# Patient Record
Sex: MALE | Race: WHITE
[De-identification: names, ages, dates, MRNs, and addresses within clinical notes are randomized per-mention and may not be internally consistent; named-entity substitution may affect disease eponyms.]

---

## 2020-09-06 ENCOUNTER — HOSPITAL ENCOUNTER (EMERGENCY)
Dept: HOSPITAL 61 - ER | Age: 50
LOS: 1 days | Discharge: HOME | End: 2020-09-07
Payer: SELF-PAY

## 2020-09-06 VITALS — WEIGHT: 270.51 LBS | BODY MASS INDEX: 45.07 KG/M2 | HEIGHT: 65 IN

## 2020-09-06 DIAGNOSIS — S90.31XA: Primary | ICD-10-CM

## 2020-09-06 DIAGNOSIS — Z90.49: ICD-10-CM

## 2020-09-06 DIAGNOSIS — I50.9: ICD-10-CM

## 2020-09-06 DIAGNOSIS — I11.9: ICD-10-CM
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
## 2020-09-06 DIAGNOSIS — Z98.890: ICD-10-CM

## 2020-09-06 DIAGNOSIS — F17.200: ICD-10-CM

## 2020-09-06 DIAGNOSIS — R60.0: ICD-10-CM
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
## 2020-09-06 DIAGNOSIS — K21.9: ICD-10-CM
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
## 2020-09-06 DIAGNOSIS — M79.89: ICD-10-CM

## 2020-09-06 PROCEDURE — 99284 EMERGENCY DEPT VISIT MOD MDM: CPT

## 2020-09-06 PROCEDURE — 36415 COLL VENOUS BLD VENIPUNCTURE: CPT

## 2020-09-06 PROCEDURE — 96372 THER/PROPH/DIAG INJ SC/IM: CPT

## 2020-09-06 PROCEDURE — 85025 COMPLETE CBC W/AUTO DIFF WBC: CPT

## 2020-09-06 PROCEDURE — 83880 ASSAY OF NATRIURETIC PEPTIDE: CPT

## 2020-09-06 PROCEDURE — 80053 COMPREHEN METABOLIC PANEL: CPT

## 2020-09-06 PROCEDURE — 96374 THER/PROPH/DIAG INJ IV PUSH: CPT

## 2020-09-06 PROCEDURE — 73630 X-RAY EXAM OF FOOT: CPT

## 2020-09-07 VITALS — SYSTOLIC BLOOD PRESSURE: 112 MMHG | DIASTOLIC BLOOD PRESSURE: 65 MMHG

## 2020-09-07 LAB
ALBUMIN SERPL-MCNC: 3.6 G/DL (ref 3.4–5)
ALBUMIN/GLOB SERPL: 0.8 {RATIO} (ref 1–1.7)
ALP SERPL-CCNC: 102 U/L (ref 46–116)
ALT SERPL-CCNC: 26 U/L (ref 16–63)
ANION GAP SERPL CALC-SCNC: 11 MMOL/L (ref 6–14)
ANISOCYTOSIS BLD QL SMEAR: (no result)
AST SERPL-CCNC: 22 U/L (ref 15–37)
BASOPHILS # BLD AUTO: 0 X10^3/UL (ref 0–0.2)
BASOPHILS NFR BLD: 1 % (ref 0–3)
BILIRUB SERPL-MCNC: 0.4 MG/DL (ref 0.2–1)
BUN SERPL-MCNC: 17 MG/DL (ref 8–26)
BUN/CREAT SERPL: 15 (ref 6–20)
CALCIUM SERPL-MCNC: 9 MG/DL (ref 8.5–10.1)
CHLORIDE SERPL-SCNC: 100 MMOL/L (ref 98–107)
CO2 SERPL-SCNC: 28 MMOL/L (ref 21–32)
CREAT SERPL-MCNC: 1.1 MG/DL (ref 0.7–1.3)
EOSINOPHIL NFR BLD: 0.2 X10^3/UL (ref 0–0.7)
EOSINOPHIL NFR BLD: 2 % (ref 0–3)
ERYTHROCYTE [DISTWIDTH] IN BLOOD BY AUTOMATED COUNT: 20.6 % (ref 11.5–14.5)
GFR SERPLBLD BASED ON 1.73 SQ M-ARVRAT: 70.6 ML/MIN
GLOBULIN SER-MCNC: 4.8 G/DL (ref 2.2–3.8)
GLUCOSE SERPL-MCNC: 101 MG/DL (ref 70–99)
HCT VFR BLD CALC: 34.7 % (ref 39–53)
HGB BLD-MCNC: 10.7 G/DL (ref 13–17.5)
HYPOCHROMIA BLD QL SMEAR: (no result)
LYMPHOCYTES # BLD: 1.5 X10^3/UL (ref 1–4.8)
LYMPHOCYTES NFR BLD AUTO: 17 % (ref 24–48)
MCH RBC QN AUTO: 22 PG (ref 25–35)
MCHC RBC AUTO-ENTMCNC: 31 G/DL (ref 31–37)
MCV RBC AUTO: 72 FL (ref 79–100)
MICROCYTES BLD QL SMEAR: SLIGHT
MONO #: 0.9 X10^3/UL (ref 0–1.1)
MONOCYTES NFR BLD: 10 % (ref 0–9)
NEUT #: 6 X10^3/UL (ref 1.8–7.7)
NEUTROPHILS NFR BLD AUTO: 70 % (ref 31–73)
PLATELET # BLD AUTO: 432 X10^3/UL (ref 140–400)
PLATELET # BLD EST: (no result) 10*3/UL
POLYCHROMASIA BLD QL SMEAR: SLIGHT
POTASSIUM SERPL-SCNC: 3.4 MMOL/L (ref 3.5–5.1)
PROT SERPL-MCNC: 8.4 G/DL (ref 6.4–8.2)
RBC # BLD AUTO: 4.79 X10^6/UL (ref 4.3–5.7)
SODIUM SERPL-SCNC: 139 MMOL/L (ref 136–145)
WBC # BLD AUTO: 8.6 X10^3/UL (ref 4–11)

## 2020-09-07 NOTE — PHYS DOC
Past Medical History


Past Medical History:  CHF, GERD, Hypertension, Other


Additional Past Medical Histor:  GASTRIC ULCERS, COLON CANCER


Past Surgical History:  Cholecystectomy


Additional Past Surgical Histo:  "R &Y" GASTRIC SLEEVE, MULTIPLE BACK SX, COLON 

SURGERY


Smoking Status:  Current Every Day Smoker


Alcohol Use:  None


Drug Use:  None





General Adult


EDM:


Chief Complaint:  LOWER EXTREMITY SWELLING





HPI:


HPI:





Patient is a 50  year old male presents with the chief complaint of bilateral 

lower extremity edema and right foot pain.





History primarily taken from wife.





Patient falls asleep and hard to keep awake during history.





Wife states 3 days ago shower tray in the shower fell onto patients right foot.





Since injury patient has had difficulty walking and pain.





On exam swelling noted to patients right foot-- there is an abrasion on the top 

of right foot.





No surround cellulitis.





Patient with bilateral lower extremity edema-- currently on lasix.





Denies any chest pain or shortness of breath.





Review of Systems:


Review of Systems:


Constitutional:   Denies fever or chills. []


Eyes:   Denies change in visual acuity. []


HENT:   Denies nasal congestion or sore throat. [] 


Respiratory:   Denies cough or shortness of breath. [] 


Cardiovascular:   Denies chest pain or edema. [] 


GI:   Denies abdominal pain, nausea, vomiting, bloody stools or diarrhea. [] 


:  Denies dysuria. [] 


Musculoskeletal:   Denies back pain or joint pain. [] 


Integument:   Denies rash. [] 


Neurologic:   Denies headache, focal weakness or sensory changes. [] 


Endocrine:   Denies polyuria or polydipsia. [] 


Lymphatic:  Denies swollen glands. [] 


Psychiatric:  Denies depression or anxiety. []





Heart Score:


Risk Factors:


Risk Factors:  DM, Current or recent (<one month) smoker, HTN, HLP, family 

history of CAD, obesity.


Risk Scores:


Score 0 - 3:  2.5% MACE over next 6 weeks - Discharge Home


Score 4 - 6:  20.3% MACE over next 6 weeks - Admit for Clinical Observation


Score 7 - 10:  72.7% MACE over next 6 weeks - Early Invasive Strategies





Current Medications:





Current Medications








 Medications


  (Trade)  Dose


 Ordered  Sig/Cheryl  Start Time


 Stop Time Status Last Admin


Dose Admin


 


 Acetaminophen/


 Hydrocodone Bitart


  (Lortab 5/325)  1 tab  1X  ONCE  9/7/20 02:30


 9/7/20 02:31 DC 9/7/20 02:33


1 TAB


 


 Fentanyl Citrate


  (Fentanyl 2ml


 Vial)  50 mcg  1X  ONCE  9/7/20 02:30


 9/7/20 02:31 DC 9/7/20 02:33


50 MCG











Allergies:


Allergies:





Allergies








Coded Allergies Type Severity Reaction Last Updated Verified


 


  NSAIDS (Non-Steroidal Anti-Inflamma Allergy Unknown  12/18/16 Yes











Physical Exam:


PE:





Constitutional: Well developed, well nourished, no acute distress, non-toxic 

appearance. []


HENT: Normocephalic, atraumatic, bilateral external ears normal, oropharynx 

moist, no oral exudates, nose normal. []


Eyes: PERRLA, EOMI, conjunctiva normal, no discharge. [] 


Neck: Normal range of motion, no tenderness, supple, no stridor. [] 


Cardiovascular:Heart rate regular rhythm, no murmur []


Lungs & Thorax:  Bilateral breath sounds clear to auscultation []


Abdomen: Bowel sounds normal, soft, no tenderness, no masses, no pulsatile 

masses. [] 


Skin: Warm, dry, no erythema, no rash. [abrasion right foot-- no surrounding 

cellulitis] 


Back: No tenderness, no CVA tenderness. [] 


Extremities: bilateral lower extremity pitting edema knee town to toes, right 

foot swelling with abrasion on top,  


Neurologic: Alert and oriented X 3, normal motor function, normal sensory 

function, no focal deficits noted. []


Psychologic: Affect normal, judgement normal, mood normal. []





Current Patient Data:


Labs:





                                Laboratory Tests








Test


 9/7/20


02:03


 


White Blood Count


 8.6 x10^3/uL


(4.0-11.0)


 


Red Blood Count


 4.79 x10^6/uL


(4.30-5.70)


 


Hemoglobin


 10.7 g/dL


(13.0-17.5)  L


 


Hematocrit


 34.7 %


(39.0-53.0)  L


 


Mean Corpuscular Volume


 72 fL ()


L


 


Mean Corpuscular Hemoglobin


 22 pg (25-35)


L


 


Mean Corpuscular Hemoglobin


Concent 31 g/dL


(31-37)


 


Red Cell Distribution Width


 20.6 %


(11.5-14.5)  H


 


Platelet Count


 432 x10^3/uL


(140-400)  H


 


Neutrophils (%) (Auto) 70 % (31-73)  


 


Lymphocytes (%) (Auto) 17 % (24-48)  L


 


Monocytes (%) (Auto) 10 % (0-9)  H


 


Eosinophils (%) (Auto) 2 % (0-3)  


 


Basophils (%) (Auto) 1 % (0-3)  


 


Neutrophils # (Auto)


 6.0 x10^3/uL


(1.8-7.7)


 


Lymphocytes # (Auto)


 1.5 x10^3/uL


(1.0-4.8)


 


Monocytes # (Auto)


 0.9 x10^3/uL


(0.0-1.1)


 


Eosinophils # (Auto)


 0.2 x10^3/uL


(0.0-0.7)


 


Basophils # (Auto)


 0.0 x10^3/uL


(0.0-0.2)


 


Platelet Estimate Pending  


 


Sodium Level


 139 mmol/L


(136-145)


 


Potassium Level


 3.4 mmol/L


(3.5-5.1)  L


 


Chloride Level


 100 mmol/L


()


 


Carbon Dioxide Level


 28 mmol/L


(21-32)


 


Anion Gap 11 (6-14)  


 


Blood Urea Nitrogen


 17 mg/dL


(8-26)


 


Creatinine


 1.1 mg/dL


(0.7-1.3)


 


Estimated GFR


(Cockcroft-Gault) 70.6  





 


BUN/Creatinine Ratio 15 (6-20)  


 


Glucose Level


 101 mg/dL


(70-99)  H


 


Calcium Level


 9.0 mg/dL


(8.5-10.1)


 


Total Bilirubin


 0.4 mg/dL


(0.2-1.0)


 


Aspartate Amino Transferase


(AST) 22 U/L (15-37)





 


Alanine Aminotransferase (ALT)


 26 U/L (16-63)





 


Alkaline Phosphatase


 102 U/L


()


 


NT-Pro-B-Type Natriuretic


Peptide 44 pg/mL


(0-124)


 


Total Protein


 8.4 g/dL


(6.4-8.2)  H


 


Albumin


 3.6 g/dL


(3.4-5.0)


 


Albumin/Globulin Ratio


 0.8 (1.0-1.7)


L





                                Laboratory Tests


9/7/20 02:03








                                Laboratory Tests


9/7/20 02:03








Vital Signs:





                                   Vital Signs








  Date Time  Temp Pulse Resp B/P (MAP) Pulse Ox O2 Delivery O2 Flow Rate FiO2


 


9/7/20 02:33   20  96 Room Air  


 


9/7/20 01:07 98.1 79  146/81 (102)    





 98.1       











EKG:


EKG:


[]





Radiology/Procedures:


Radiology/Procedures:


[]


Impression:


xray no acute fractures


no soft tissue air


edema noted





Course & Med Decision Making:


Course & Med Decision Making


Pertinent Labs and Imaging studies reviewed. (See chart for details)





[]Pain treated with fentanyl and norflex.





Patient discharged home with lasix, norflex norco and keflex





Dragon Disclaimer:


Dragon Disclaimer:


This electronic medical record was generated, in whole or in part, using a voice

 recognition dictation system.





Departure


Departure


Impression:  


   Primary Impression:  


   Edema of both lower extremities


   Additional Impressions:  


   Foot abrasion


   Foot contusion


Disposition:  01 HOME, SELF-CARE


Condition:  STABLE


Patient Instructions:  Edema, Foot Contusion


Scripts


Furosemide (LASIX) 40 Mg Tablet


1 TAB PO DAILY for 30 Days, #30 TAB 0 Refills


   Prov: TOAN MCGINNIS DO         9/7/20 


Hydrocodone/Apap 5-325 (NORCO 5-325 TABLET) 1 Each Tablet


1 TAB PO BID, #20 TAB


   Prov: TOAN MCGINNIS DO         9/7/20 


Cephalexin (KEFLEX) 500 Mg Capsule


500 MG PO QID for 10 Days, #40 CAP


   Prov: TOAN MCGINNIS DO         9/7/20





Justicifation of Admission Dx:


Justifications for Admission:


Justification of Admission Dx:  N/A











TOAN MCGINNIS DO             Sep 7, 2020 03:55

## 2020-09-07 NOTE — RAD
RIGHT FOOT AP LATERAL OBLIQUE

 

Clinical Indication: Reason: foot pain / Spl. Instructions:  / History: 

 

Comparison: None.

 

Findings: 

There is no acute fracture or dislocation.  The bony alignment is normal. 

Mineralization is normal. No bony erosion.

 

There is moderate dorsal soft tissue swelling overlying the metatarsals. 

There is subcutaneous edema of the distal calf.

 

IMPRESSION:

1.  No acute bone abnormality.

2.  Soft tissue swelling.

 

Electronically signed by: Clif Andrew MD (9/7/2020 4:12 AM) [facility]

## 2022-04-05 ENCOUNTER — HOSPITAL ENCOUNTER (EMERGENCY)
Dept: HOSPITAL 63 - ER | Age: 52
Discharge: HOME | End: 2022-04-05
Payer: COMMERCIAL

## 2022-04-05 VITALS — HEIGHT: 69 IN | BODY MASS INDEX: 40.42 KG/M2 | WEIGHT: 272.93 LBS

## 2022-04-05 VITALS — DIASTOLIC BLOOD PRESSURE: 60 MMHG | SYSTOLIC BLOOD PRESSURE: 123 MMHG

## 2022-04-05 DIAGNOSIS — Z98.84: ICD-10-CM

## 2022-04-05 DIAGNOSIS — K21.9: ICD-10-CM

## 2022-04-05 DIAGNOSIS — R60.0: Primary | ICD-10-CM

## 2022-04-05 DIAGNOSIS — M79.662: ICD-10-CM

## 2022-04-05 LAB
ALBUMIN SERPL-MCNC: 3.2 G/DL (ref 3.4–5)
ALBUMIN/GLOB SERPL: 0.9 {RATIO} (ref 1–1.7)
ALP SERPL-CCNC: 94 U/L (ref 46–116)
ALT SERPL-CCNC: 26 U/L (ref 16–63)
ANION GAP SERPL CALC-SCNC: 9 MMOL/L (ref 6–14)
ANISOCYTOSIS BLD QL SMEAR: (no result)
AST SERPL-CCNC: 50 U/L (ref 15–37)
BASOPHILS # BLD AUTO: 0 X10^3/UL (ref 0–0.2)
BASOPHILS NFR BLD: 1 % (ref 0–3)
BILIRUB SERPL-MCNC: 0.6 MG/DL (ref 0.2–1)
BUN/CREAT SERPL: 20 (ref 6–20)
CA-I SERPL ISE-MCNC: 16 MG/DL (ref 8–26)
CALCIUM SERPL-MCNC: 8.2 MG/DL (ref 8.5–10.1)
CHLORIDE SERPL-SCNC: 106 MMOL/L (ref 98–107)
CO2 SERPL-SCNC: 26 MMOL/L (ref 21–32)
CREAT SERPL-MCNC: 0.8 MG/DL (ref 0.7–1.3)
EOSINOPHIL NFR BLD: 0.2 X10^3/UL (ref 0–0.7)
EOSINOPHIL NFR BLD: 4 % (ref 0–3)
ERYTHROCYTE [DISTWIDTH] IN BLOOD BY AUTOMATED COUNT: 20.9 % (ref 11.5–14.5)
GFR SERPLBLD BASED ON 1.73 SQ M-ARVRAT: 101.5 ML/MIN
GLOBULIN SER-MCNC: 3.6 G/DL (ref 2.2–3.8)
GLUCOSE SERPL-MCNC: 88 MG/DL (ref 70–99)
HCT VFR BLD CALC: 26.8 % (ref 39–53)
HGB BLD-MCNC: 8 G/DL (ref 13–17.5)
HYPOCHROMIA BLD QL SMEAR: (no result)
LYMPHOCYTES # BLD: 1.7 X10^3/UL (ref 1–4.8)
LYMPHOCYTES NFR BLD AUTO: 31 % (ref 24–48)
MCH RBC QN AUTO: 21 PG (ref 25–35)
MCHC RBC AUTO-ENTMCNC: 30 G/DL (ref 31–37)
MCV RBC AUTO: 71 FL (ref 79–100)
MICROCYTES BLD QL SMEAR: (no result)
MONO #: 0.5 X10^3/UL (ref 0–1.1)
MONOCYTES NFR BLD: 10 % (ref 0–9)
NEUT #: 3 X10^3UL (ref 1.8–7.7)
NEUTROPHILS NFR BLD AUTO: 54 % (ref 31–73)
PLATELET # BLD AUTO: 414 X10^3/UL (ref 140–400)
PLATELET # BLD EST: (no result) 10*3/UL
POTASSIUM SERPL-SCNC: 3.4 MMOL/L (ref 3.5–5.1)
PROT SERPL-MCNC: 6.8 G/DL (ref 6.4–8.2)
RBC # BLD AUTO: 3.79 X10^6/UL (ref 4.3–5.7)
SODIUM SERPL-SCNC: 141 MMOL/L (ref 136–145)
WBC # BLD AUTO: 5.5 X10^3/UL (ref 4–11)

## 2022-04-05 PROCEDURE — 85025 COMPLETE CBC W/AUTO DIFF WBC: CPT

## 2022-04-05 PROCEDURE — 93005 ELECTROCARDIOGRAM TRACING: CPT

## 2022-04-05 PROCEDURE — 36415 COLL VENOUS BLD VENIPUNCTURE: CPT

## 2022-04-05 PROCEDURE — 80053 COMPREHEN METABOLIC PANEL: CPT

## 2022-04-05 PROCEDURE — 83880 ASSAY OF NATRIURETIC PEPTIDE: CPT

## 2022-04-05 PROCEDURE — 71045 X-RAY EXAM CHEST 1 VIEW: CPT

## 2022-04-05 PROCEDURE — 84484 ASSAY OF TROPONIN QUANT: CPT

## 2022-04-05 PROCEDURE — 99285 EMERGENCY DEPT VISIT HI MDM: CPT

## 2022-04-05 PROCEDURE — 93970 EXTREMITY STUDY: CPT

## 2022-04-05 NOTE — PHYS DOC
Past History


Past Surgical History:  Appendectomy


 (BARBARA HOFF)





General Adult


EDM:


Chief Complaint:  LOWER EXT PAIN





HPI:


HPI:


Patient is a 51-year-old male who presents with bilateral lower extremity 

swelling and pain to his left lower leg that started 2 days ago.  Patient 

reports that the swelling initially started 2 weeks ago.  He reports at rest he 

does not have any pain but with palpation he has increased pain.  Patient has a 

history of colon cancer, gastric bypass and GERD.  He denies any injuries, chest

pain, shortness of breath, abdominal or upper extremity swelling, fevers, 

history of DVTs, history of CHF. 


 (BARBARA HOFF)





Review of Systems:


Review of Systems:


Constitutional: See HPI


Respiratory: See HPI


Cardiovascular: See HPI


GI: See HPI


Musculoskeletal: See HPI


Integument: See HPI


Neurologic: Reports chronic neuropathy in his left lower leg


 (BARBARA HOFF)





Allergies:


Allergies:





Allergies








Coded Allergies Type Severity Reaction Last Updated Verified


 


  No Known Drug Allergies    4/5/22 No








 (BARBARA HOFF)





Physical Exam:


PE:





Constitutional: Well developed, well nourished, no acute distress, non-toxic 

appearance. []


HENT: Normocephalic, atraumatic, bilateral external ears normal, oropharynx 

moist, no oral exudates, nose normal. []


Eyes: PERRL, EOMI, conjunctiva normal, no discharge. [] 


Neck: Normal range of motion, no stridor


Cardiovascular:Heart rate regular rhythm, no murmur []


Lungs & Thorax:  Bilateral breath sounds clear to auscultation []


Abdomen: Bowel sounds normal, soft, no tenderness, obese, no masses, no 

pulsatile masses. [] 


Skin: Warm, dry, mild anterior erythema noted to anterior aspect of bilateral 

lower extremities


Back: Normal range of motion


Extremities: No tenderness, no cyanosis, no clubbing, ROM intact, 2+ pitting 

edema noted to right lower extremity, 3+ pittingedema noted to left lower 

extremity, strong DP pulse bilaterally


Neurologic: Alert and oriented X 3, normal motor function, normal sensory 

function, no focal deficits noted. []


Psychologic: Affect normal, judgement normal, mood normal. []


 (BARBARA HOFF)





Current Patient Data:


Vital Signs:





                                   Vital Signs








  Date Time  Temp Pulse Resp B/P (MAP) Pulse Ox O2 Delivery O2 Flow Rate FiO2


 


4/5/22 20:35 98.0 73 24 123/60 (81) 99 Room Air  








 (BARBARA HOFF)





EKG:


EKG:


EKG performed by ER staff at 2059 shows sinus rhythm with rate of 67, QTc is 

434, no STEMI read by Dr. Acuña [id number][]


 (BARBARA HOFF)





Radiology/Procedures:


Radiology/Procedures:


[]


 (BARBARA HOFF)


Impressions:


STUDY:  US BILATERAL LOWEREXTREMITY VENOUS DOPPLER 





INDICATION: Leg swelling and redness. DVT.





TECHNIQUE:  Color-flow and pulsed wave duplex ultrasound with compression of 

venous structures of the bilateral lower extremities.





COMPARISON: None.





FINDINGS: 





Duplex ultrasound with compression of the deep venous structures of the 

bilateral lower extremities from the common femoral vein through the popliteal 

vein is negative for DVT. 





The posterior tibial and peroneal veins are segmentally visualized and patent 

where seen. Normal venous waveforms and augmentation are noted throughout.





Bilateral lower extremity edema. Subcentimeter short axis probable reactive 

lymph node at the left groin.





IMPRESSION:





1. No deep venous thrombosis identified throughout either lower extremity.


2. Bilateral lower extremity edema.





Electronically signed by: TE PALMA MD (4/5/2022 10:41 PM) [facility]














DICTATED AND SIGNED BY:     TE PALMA MD


DATE:     04/05/22 2240





CC: IVORY ACUÑA DO; BARBARA HOFF; ARLEEN HERNANDEZ MD ~


 (IVORY ACUÑA DO)


Heart Score:


C/O Chest Pain:  No


Risk Factors:


Risk Factors:  DM, Current or recent (<one month) smoker, HTN, HLP, family 

history of CAD, obesity.


Risk Scores:


Score 0 - 3:  2.5% MACE over next 6 weeks - Discharge Home


Score 4 - 6:  20.3% MACE over next 6 weeks - Admit for Clinical Observation


Score 7 - 10:  72.7% MACE over next 6 weeks - Early Invasive Strategies


 (BARBARA HOFF)





Course & Med Decision Making:


Course & Med Decision Making


Pertinent Labs and Imaging studies reviewed. (See chart for details)





[] Patient presents to the emergency department with bilateral lower extremity 

swelling that started 2 weeks ago and increased pain in his left lower leg that 

started 2 days ago.  Work-up in the emergency department today consisted of 

blood work including BNP, EKG, chest x-ray to determine if he has any fluid 

overload, and ultrasound of bilateral lower extremities to rule out DVT.


2153: Patients lab work is pending at this time. Awaiting ultrasound and CXR. I 

discussed patients case with supervising physician and he will assume patient 

care at this time due to shift change. 


 (BARBARA HOFF)


Course & Med Decision Making


The patient's ultrasound is negative for DVT.  His labs are significant for 

anemia with a hemoglobin of 8.  Have no previous for comparison.  I have advised

 the patient continue to monitor this and his lower extremity edema with his 

primary care physician.  He is stable for discharge at this time.


 (IVORY ACUÑA DO)


Dragon Disclaimer:


Dragon Disclaimer:


This electronic medical record was generated, in whole or in part, using a voice

 recognition dictation system.


 (BARBARA HOFF)





Departure


Departure:


Impression:  


   Primary Impression:  


   Bilateral lower extremity edema


Disposition:  01 HOME / SELF CARE / HOMELESS


Condition:  STABLE


Referrals:  


ARLEEN HERNANDEZ MD (PCP)


Patient Instructions:  Peripheral Edema











BARBARA HOFF           Apr 5, 2022 20:55


IVORY ACUÑA DO                  Apr 5, 2022 23:02

## 2022-04-05 NOTE — RAD
Study: XR CHEST 1V



Indication: Bilateral lower extremity swelling.



Comparison: None.



Findings:



The cardiomediastinal silhouette and tasha are within normal limits. No localized airspace opacity, pl
eural effusion or pneumothorax.



Impression:



No acute radiographic abnormality of the chest. 



Electronically signed by: TE PALMA MD (4/5/2022 9:42 PM) [facility]

## 2022-04-05 NOTE — EKG
[facility] [address], [place] [address]

Test Date:    2022               Test Time:    20:59:48

Pat Name:     LIDIA BARTHOLOMEW           Department:   

Patient ID:   [id number]           Room:          

Gender:       M                        Technician:   

:          [id number]               Requested By: BARBARA HOFF

Order Number: [id number]            Reading MD:   Cali Fonseca

                                 Measurements

Intervals                              Axis          

Rate:         67                       P:            64

TN:           174                      QRS:          26

QRSD:         98                       T:            -2

QT:           408                                    

QTc:          434                                    

                           Interpretive Statements

SINUS RHYTHM

NORMAL ECG

RI6.02

No previous ECG available for comparison

Electronically Signed On 2022 9:39:06 CDT by Cali Fonseca

## 2022-04-05 NOTE — RAD
STUDY:  US BILATERAL LOWEREXTREMITY VENOUS DOPPLER 



INDICATION: Leg swelling and redness. DVT.



TECHNIQUE:  Color-flow and pulsed wave duplex ultrasound with compression of venous structures of the
 bilateral lower extremities.



COMPARISON: None.



FINDINGS: 



Duplex ultrasound with compression of the deep venous structures of the bilateral lower extremities f
rom the common femoral vein through the popliteal vein is negative for DVT. 



The posterior tibial and peroneal veins are segmentally visualized and patent where seen. Normal veno
us waveforms and augmentation are noted throughout.



Bilateral lower extremity edema. Subcentimeter short axis probable reactive lymph node at the left gr
oin.



IMPRESSION:



1. No deep venous thrombosis identified throughout either lower extremity.

2. Bilateral lower extremity edema.



Electronically signed by: TE PALMA MD (4/5/2022 10:41 PM) [facility]